# Patient Record
Sex: FEMALE | Race: BLACK OR AFRICAN AMERICAN | NOT HISPANIC OR LATINO | Employment: UNEMPLOYED | URBAN - METROPOLITAN AREA
[De-identification: names, ages, dates, MRNs, and addresses within clinical notes are randomized per-mention and may not be internally consistent; named-entity substitution may affect disease eponyms.]

---

## 2019-09-30 ENCOUNTER — APPOINTMENT (OUTPATIENT)
Dept: CT IMAGING | Facility: HOSPITAL | Age: 24
End: 2019-09-30

## 2019-09-30 ENCOUNTER — HOSPITAL ENCOUNTER (EMERGENCY)
Facility: HOSPITAL | Age: 24
Discharge: HOME | End: 2019-10-01
Attending: EMERGENCY MEDICINE | Admitting: EMERGENCY MEDICINE

## 2019-09-30 DIAGNOSIS — R06.00 DYSPNEA, UNSPECIFIED TYPE: Primary | ICD-10-CM

## 2019-09-30 DIAGNOSIS — Z3A.24 24 WEEKS GESTATION OF PREGNANCY: ICD-10-CM

## 2019-09-30 DIAGNOSIS — R42 DIZZY: ICD-10-CM

## 2019-09-30 LAB
ANION GAP SERPL CALCULATED.3IONS-SCNC: 11.7 MMOL/L (ref 5–15)
BASOPHILS # BLD AUTO: 0 10*3/MM3 (ref 0–0.2)
BASOPHILS NFR BLD AUTO: 0.3 % (ref 0–1.5)
BUN BLD-MCNC: 6 MG/DL (ref 8–20)
BUN/CREAT SERPL: 12 (ref 5.4–26.2)
CALCIUM SPEC-SCNC: 8.4 MG/DL (ref 8.9–10.3)
CHLORIDE SERPL-SCNC: 103 MMOL/L (ref 101–111)
CO2 SERPL-SCNC: 22 MMOL/L (ref 22–32)
CREAT BLD-MCNC: 0.5 MG/DL (ref 0.4–1)
DEPRECATED RDW RBC AUTO: 40.3 FL (ref 37–54)
EOSINOPHIL # BLD AUTO: 0.1 10*3/MM3 (ref 0–0.4)
EOSINOPHIL NFR BLD AUTO: 0.6 % (ref 0.3–6.2)
ERYTHROCYTE [DISTWIDTH] IN BLOOD BY AUTOMATED COUNT: 13.6 % (ref 12.3–15.4)
GFR SERPL CREATININE-BSD FRML MDRD: >150 ML/MIN/1.73
GFR SERPL CREATININE-BSD FRML MDRD: >150 ML/MIN/1.73
GLUCOSE BLD-MCNC: 125 MG/DL (ref 65–99)
HCT VFR BLD AUTO: 29.5 % (ref 34–46.6)
HGB BLD-MCNC: 10 G/DL (ref 12–15.9)
HOLD SPECIMEN: NORMAL
LYMPHOCYTES # BLD AUTO: 2.5 10*3/MM3 (ref 0.7–3.1)
LYMPHOCYTES NFR BLD AUTO: 20 % (ref 19.6–45.3)
MCH RBC QN AUTO: 28.6 PG (ref 26.6–33)
MCHC RBC AUTO-ENTMCNC: 33.8 G/DL (ref 31.5–35.7)
MCV RBC AUTO: 84.8 FL (ref 79–97)
MONOCYTES # BLD AUTO: 0.5 10*3/MM3 (ref 0.1–0.9)
MONOCYTES NFR BLD AUTO: 4.3 % (ref 5–12)
NEUTROPHILS # BLD AUTO: 9.4 10*3/MM3 (ref 1.7–7)
NEUTROPHILS NFR BLD AUTO: 74.8 % (ref 42.7–76)
NRBC BLD AUTO-RTO: 0 /100 WBC (ref 0–0.2)
PLATELET # BLD AUTO: 331 10*3/MM3 (ref 140–450)
PMV BLD AUTO: 7.4 FL (ref 6–12)
POTASSIUM BLD-SCNC: 3.7 MMOL/L (ref 3.6–5.1)
RBC # BLD AUTO: 3.49 10*6/MM3 (ref 3.77–5.28)
SODIUM BLD-SCNC: 133 MMOL/L (ref 136–144)
TROPONIN I SERPL-MCNC: <0.03 NG/ML (ref 0–0.03)
WBC NRBC COR # BLD: 12.5 10*3/MM3 (ref 3.4–10.8)
WHOLE BLOOD HOLD SPECIMEN: NORMAL

## 2019-09-30 PROCEDURE — 84484 ASSAY OF TROPONIN QUANT: CPT | Performed by: EMERGENCY MEDICINE

## 2019-09-30 PROCEDURE — 70450 CT HEAD/BRAIN W/O DYE: CPT

## 2019-09-30 PROCEDURE — 93005 ELECTROCARDIOGRAM TRACING: CPT | Performed by: EMERGENCY MEDICINE

## 2019-09-30 PROCEDURE — 25010000002 ONDANSETRON PER 1 MG: Performed by: EMERGENCY MEDICINE

## 2019-09-30 PROCEDURE — 80048 BASIC METABOLIC PNL TOTAL CA: CPT | Performed by: EMERGENCY MEDICINE

## 2019-09-30 PROCEDURE — 96374 THER/PROPH/DIAG INJ IV PUSH: CPT

## 2019-09-30 PROCEDURE — 0 IOPAMIDOL PER 1 ML: Performed by: EMERGENCY MEDICINE

## 2019-09-30 PROCEDURE — 71275 CT ANGIOGRAPHY CHEST: CPT

## 2019-09-30 PROCEDURE — 85025 COMPLETE CBC W/AUTO DIFF WBC: CPT | Performed by: EMERGENCY MEDICINE

## 2019-09-30 PROCEDURE — 99283 EMERGENCY DEPT VISIT LOW MDM: CPT

## 2019-09-30 RX ORDER — ONDANSETRON 2 MG/ML
4 INJECTION INTRAMUSCULAR; INTRAVENOUS ONCE
Status: COMPLETED | OUTPATIENT
Start: 2019-09-30 | End: 2019-09-30

## 2019-09-30 RX ORDER — SODIUM CHLORIDE 0.9 % (FLUSH) 0.9 %
10 SYRINGE (ML) INJECTION AS NEEDED
Status: DISCONTINUED | OUTPATIENT
Start: 2019-09-30 | End: 2019-10-01 | Stop reason: HOSPADM

## 2019-09-30 RX ORDER — MECLIZINE HYDROCHLORIDE 25 MG/1
25 TABLET ORAL ONCE
Status: COMPLETED | OUTPATIENT
Start: 2019-09-30 | End: 2019-09-30

## 2019-09-30 RX ADMIN — ONDANSETRON 4 MG: 2 INJECTION INTRAMUSCULAR; INTRAVENOUS at 22:33

## 2019-09-30 RX ADMIN — SODIUM CHLORIDE 1000 ML: 900 INJECTION, SOLUTION INTRAVENOUS at 22:32

## 2019-09-30 RX ADMIN — IOPAMIDOL 75 ML: 755 INJECTION, SOLUTION INTRAVENOUS at 23:07

## 2019-09-30 RX ADMIN — MECLIZINE HYDROCLORIDE 25 MG: 25 TABLET ORAL at 22:32

## 2019-10-01 VITALS
SYSTOLIC BLOOD PRESSURE: 100 MMHG | TEMPERATURE: 98.4 F | HEART RATE: 72 BPM | OXYGEN SATURATION: 98 % | BODY MASS INDEX: 32.18 KG/M2 | WEIGHT: 205 LBS | DIASTOLIC BLOOD PRESSURE: 40 MMHG | HEIGHT: 67 IN | RESPIRATION RATE: 16 BRPM

## 2019-10-01 RX ORDER — MECLIZINE HYDROCHLORIDE 25 MG/1
25 TABLET ORAL 3 TIMES DAILY PRN
Qty: 15 TABLET | Refills: 0 | Status: SHIPPED | OUTPATIENT
Start: 2019-10-01

## 2019-10-01 NOTE — DISCHARGE INSTRUCTIONS
Rest plenty of fluids  Follow-up Dr. Cortez tomorrow.  Return for severe headache visual change speech difficulty unable to walk paralysis to one side the other chest pain shortness of breath increasing passing out facial asymmetry or abdominal pain vaginal bleeding or any other new or worse problems or concerns.  No driving or heights until dizziness resolves.  Meclizine if needed

## 2019-10-01 NOTE — ED NOTES
Pt reports SOA and dizziness with movement x3 hours. Pt is 24 weeks pregnant, OB Diego, A0. Pt reports she had similar symptoms during her last pregnancy and states the baby was having decels during the episodes. OB called for fetal monitoring.      Alison Stovall, DAVE  [phone]

## 2019-10-01 NOTE — ED PROVIDER NOTES
Subjective   Chief complaint dizzy and shortness of breath    History of present illness 25-year-old female who complains 3-hour history of some dizziness and shortness of breath.  No headache.  No visual changes no speech difficulty no paralysis.  Patient states she is able to walk okay but she states that it is a feeling of a spinning sensation is started when she raise her head up.  And seems to be better now.  She states that shortness of breath feels like she cannot catch her breath a feeling of breathlessness.  There is no chest pain neck arm or jaw pain cough congestion fever chills and there is no recent foreign travels no recent long car ride plane ride immobilization no leg pain or swelling.  Patient is 24 weeks pregnant she has no abdominal pain or vaginal bleeding.  Symptoms were moderate degree worse when she lifted her head up and triggered by that motion.  But now better.            Review of Systems   Constitutional: Negative for chills and fever.   HENT: Negative for congestion and sinus pressure.    Eyes: Negative for photophobia and visual disturbance.   Respiratory: Positive for shortness of breath. Negative for chest tightness.    Cardiovascular: Negative for chest pain and leg swelling.   Gastrointestinal: Negative for abdominal pain, blood in stool and vomiting.   Endocrine: Negative for cold intolerance and heat intolerance.   Genitourinary: Negative for difficulty urinating and dysuria.   Musculoskeletal: Negative for arthralgias and back pain.   Skin: Negative for color change and pallor.   Neurological: Positive for dizziness and light-headedness. Negative for seizures, facial asymmetry, speech difficulty and headaches.   Psychiatric/Behavioral: Negative for agitation and behavioral problems.       No past medical history on file.  Currently 24 weeks pregnant  Allergies   Allergen Reactions   • Sulfa Antibiotics Hives       No past surgical history on file.    No family history on  file.    Social History     Socioeconomic History   • Marital status: Single     Spouse name: Not on file   • Number of children: Not on file   • Years of education: Not on file   • Highest education level: Not on file     Prior to Admission medications    Not on File           Objective   Physical Exam  24-year-old female awake alert no acute distress HEENT extraocular muscles intact pupils equal and reactive the TMs are clear there is some mild unidirectional nystagmus the mouth is clear.  The neck is supple no adenopathy no JVD no bruits lungs clear no retractions heart regular without murmur abdomen was soft without tenderness good bowel sounds she has an enlarged uterus appropriate for days but no tenderness or pain extremities pulses are equal throughout upper and lower extremities no edema cords or Homans sign or evidence of DVT no cellulitic changes.  Neurologic exam patient's awake alert she has some minimal dizziness she states currently extraocular muscle intact pupils equal reactive no photophobia disks are sharp there is no visual loss of peripheral vision is intact with confrontation.  The soft palate normal uvula midline tongue midline there is no facial asymmetry she has normal speech she has a negative test askew difficult to appreciate any type of head impulse negative test of skew no drift in arms or legs normal finger-to-nose there is no ataxia patient is able to stand and walk without difficulty.  There is no ataxia.  Procedures           ED Course      Results for orders placed or performed during the hospital encounter of 09/30/19   Basic Metabolic Panel   Result Value Ref Range    Glucose 125 (H) 65 - 99 mg/dL    BUN 6 (L) 8 - 20 mg/dL    Creatinine 0.50 0.40 - 1.00 mg/dL    Sodium 133 (L) 136 - 144 mmol/L    Potassium 3.7 3.6 - 5.1 mmol/L    Chloride 103 101 - 111 mmol/L    CO2 22.0 22.0 - 32.0 mmol/L    Calcium 8.4 (L) 8.9 - 10.3 mg/dL    eGFR  African Amer >150 >60 mL/min/1.73    eGFR Non   Amer >150 >60 mL/min/1.73    BUN/Creatinine Ratio 12.0 5.4 - 26.2    Anion Gap 11.7 5.0 - 15.0 mmol/L   Troponin   Result Value Ref Range    Troponin I <0.030 0.000 - 0.030 ng/mL   CBC Auto Differential   Result Value Ref Range    WBC 12.50 (H) 3.40 - 10.80 10*3/mm3    RBC 3.49 (L) 3.77 - 5.28 10*6/mm3    Hemoglobin 10.0 (L) 12.0 - 15.9 g/dL    Hematocrit 29.5 (L) 34.0 - 46.6 %    MCV 84.8 79.0 - 97.0 fL    MCH 28.6 26.6 - 33.0 pg    MCHC 33.8 31.5 - 35.7 g/dL    RDW 13.6 12.3 - 15.4 %    RDW-SD 40.3 37.0 - 54.0 fl    MPV 7.4 6.0 - 12.0 fL    Platelets 331 140 - 450 10*3/mm3    Neutrophil % 74.8 42.7 - 76.0 %    Lymphocyte % 20.0 19.6 - 45.3 %    Monocyte % 4.3 (L) 5.0 - 12.0 %    Eosinophil % 0.6 0.3 - 6.2 %    Basophil % 0.3 0.0 - 1.5 %    Neutrophils, Absolute 9.40 (H) 1.70 - 7.00 10*3/mm3    Lymphocytes, Absolute 2.50 0.70 - 3.10 10*3/mm3    Monocytes, Absolute 0.50 0.10 - 0.90 10*3/mm3    Eosinophils, Absolute 0.10 0.00 - 0.40 10*3/mm3    Basophils, Absolute 0.00 0.00 - 0.20 10*3/mm3    nRBC 0.0 0.0 - 0.2 /100 WBC   Light Blue Top   Result Value Ref Range    Extra Tube hold for add-on    Gold Top - SST   Result Value Ref Range    Extra Tube Hold for add-ons.      Ct Head Without Contrast    Result Date: 9/30/2019  1. No acute intracranial process. MRI is more sensitive for the detection of acute nonhemorrhagic infarct.  Electronically signed by:  Aleks Mayer M.D.  9/30/2019 9:48 PM    Ct Chest Pulmonary Embolism With Contrast    Result Date: 9/30/2019  Impression: 1. No pulmonary embolus. 2. No acute abnormality. Slot 64 Electronically signed by:  Dilip Paniagua M.D.  9/30/2019 9:53 PM    Medications   sodium chloride 0.9 % flush 10 mL (not administered)   sodium chloride 0.9 % bolus 1,000 mL (1,000 mL Intravenous New Bag 9/30/19 2232)   meclizine (ANTIVERT) tablet 25 mg (25 mg Oral Given 9/30/19 2232)   ondansetron (ZOFRAN) injection 4 mg (4 mg Intravenous Given 9/30/19 2233)   iopamidol  (ISOVUE-370) 76 % injection 75 mL (75 mL Intravenous Given 9/30/19 8984)     EKG my interpretation normal sinus rhythm rate of 65 normal axis no hypertrophy QTC of 4 and 48 normal EKG              MDM  Number of Diagnoses or Management Options  Diagnosis management comments: Medical decision making.  She had IV established placed on a monitor given liter saline she was given cuisine 25 mg p.o. Zofran 4 mg IV and had the above exam and evaluation EKG was normal CBC and electrolytes troponin negative.  The patient complains of shortness of breath her vital signs look okay respiratory rate of 18 heart rate in 80s.  Sats 98%.  She does have increased risk for pulmonary embolism secondary to being pregnant and she complains of the feeling of breathlessness and dizziness and lightheadedness.  We talked about CT scan and risk during pregnancy and radiation levels and patient voiced understanding and we talked about pulmonary embolism and strokes and subsequent consequences if missed.  And patient was agreeable for CT scan.  CT scan pulmonary embolism protocol was negative and CT head without was negative.  The patient on repeat examination at 12:30 AM was resting comfortably she was feeling better she was able to get up and walk to the bathroom without difficulty there is no ataxia or focal findings on neurological exam I do not see any evidence of an acute stroke by clinical exam.  She currently does not have dizziness this was a triggered event by lifting the head up and it was intermittent.  It was not continuous.  There is a negative test askew she walks without ataxia and no other findings on her clinical exam.  The patient has no evidence to suggest an aortic dissection or carotid dissection there is no pain anywhere.  There is no other acute findings.  She was stable she felt much better and she will be discharged home for outpatient management the patient did have some fetal monitoring and normal heart tones no  evidence of pregnancy related problems.  She was stable and she was discharged home      Final diagnoses:   Dyspnea, unspecified type   Dizzy   24 weeks gestation of pregnancy              Kam Bello MD  10/01/19 0052